# Patient Record
Sex: MALE | Race: BLACK OR AFRICAN AMERICAN | NOT HISPANIC OR LATINO | Employment: OTHER | URBAN - METROPOLITAN AREA
[De-identification: names, ages, dates, MRNs, and addresses within clinical notes are randomized per-mention and may not be internally consistent; named-entity substitution may affect disease eponyms.]

---

## 2017-09-27 NOTE — PATIENT DISCUSSION
"""rec yag cap od today. see signed consent. "" ""Informed patient that their capsular opacification is visually significant and meets the minimum criteria for Capsulotomy by YAG laser to increase their vision and decrease their glare symptoms. RBALs of procedure discussed with patient.  Risks include increased IOP

## 2018-10-18 NOTE — PATIENT DISCUSSION
"""discussed with patient that the lesion is not a sinister lesion.  if he becomes bothered by it it can ""

## 2018-10-18 NOTE — PATIENT DISCUSSION
"""discussed with patient that he need to start artificial tears bid-qid to keep the eye moist. if he ""

## 2019-01-30 NOTE — PATIENT DISCUSSION
"""Recommend Lateral Tarsal Strip with 3 Snip Punctoplasty of the Right Lower Eyelid with excision ""

## 2019-03-21 NOTE — PATIENT DISCUSSION
"""healed well. suture removed from right lateral canthus.  patient to use candido to nasal lower lid ""

## 2019-05-30 NOTE — PATIENT DISCUSSION
"""healed welllateral tarsal strip right lower lid healed well. lid is slightly inflamed.  recommend ""

## 2021-07-28 NOTE — PATIENT DISCUSSION
Doing well s/p ectropion repair. Patient to use ANTONIETTA until 3 more nights then stop secondary to suture removal today.

## 2022-02-03 ENCOUNTER — NEW PATIENT (OUTPATIENT)
Dept: URBAN - METROPOLITAN AREA CLINIC 13 | Facility: CLINIC | Age: 72
End: 2022-02-03

## 2022-02-03 DIAGNOSIS — H47.233: ICD-10-CM

## 2022-02-03 DIAGNOSIS — H52.223: ICD-10-CM

## 2022-02-03 PROCEDURE — 92250 FUNDUS PHOTOGRAPHY W/I&R: CPT

## 2022-02-03 PROCEDURE — 92015 DETERMINE REFRACTIVE STATE: CPT

## 2022-02-03 PROCEDURE — 92004 COMPRE OPH EXAM NEW PT 1/>: CPT

## 2022-02-03 ASSESSMENT — KERATOMETRY
OS_K2POWER_DIOPTERS: 43.50
OS_K1POWER_DIOPTERS: 43.50
OS_AXISANGLE2_DEGREES: 60
OD_AXISANGLE2_DEGREES: 62
OD_K1POWER_DIOPTERS: 43.25
OD_K2POWER_DIOPTERS: 42.25
OS_AXISANGLE_DEGREES: 150
OD_AXISANGLE_DEGREES: 152

## 2022-02-03 ASSESSMENT — TONOMETRY
OD_IOP_MMHG: 15
OS_IOP_MMHG: 11

## 2022-02-03 ASSESSMENT — VISUAL ACUITY
OD_SC: 20/25
OS_SC: 20/25
OU_SC: 20/20

## 2022-02-03 NOTE — PATIENT DISCUSSION
Patient has beginings of cats forming. He also has big optic nerve, iop was low so we will do baseline testing if there are changes.

## 2022-03-28 NOTE — PATIENT DISCUSSION
Plan: i-lasik verna OD. [facility] let team know when pt is scheduled so PO drops can be sent.

## 2022-05-19 NOTE — PATIENT DISCUSSION
Plan: i-lasik verna OD. [facility] let team know when pt is scheduled so PO drops can be sent.

## 2022-05-20 NOTE — PATIENT DISCUSSION
Plan: i-lasik verna OD. [facility] let team know when pt is scheduled so PO drops can be sent.

## 2022-05-27 NOTE — PATIENT DISCUSSION
Plan: i-lasik verna OD. [facility] let team know when pt is scheduled so PO drops can be sent.

## 2022-06-17 NOTE — PATIENT DISCUSSION
Plan: i-lasik verna OD. [facility] let team know when pt is scheduled so PO drops can be sent.

## 2022-06-17 NOTE — PATIENT DISCUSSION
Recommended warm compresses and PF AT's. [Change in Weight] : change in weight [Negative] : Genitourinary

## 2022-08-04 NOTE — PATIENT DISCUSSION
MR stable and patient noticed significant improvement with trial frame. Consult with Dr. Candelario Maria for enhancement. Patient leaving for [place] on Saturday and returning in mid October.

## 2022-08-04 NOTE — PATIENT DISCUSSION
Plan: i-lasik verna OD. [facility] let team know when pt is scheduled so PO drops can be sent.

## 2022-11-16 NOTE — PATIENT DISCUSSION
11/16/22 JOD: Patient did not see significant improvement with MR today. His vision blurriness is possibly secondary to ocular dryness and AMD. Treat try eye and refer to retina.

## 2022-11-16 NOTE — PROCEDURE NOTE: CLINICAL
PROCEDURE NOTE: Punctal Plugs, Collagen  #4 All 4 Lids. Diagnosis: Dry Eye Syndrome. Anesthesia: Proparacaine 0.5%. Prior to treatment, the risks/benefits/alternatives were discussed. The patient wished to proceed with procedure. 1 drop of Proparacaine 0.5% was instilled in eye. Puncta was dilated with punctal dilator. Collagen plugs were placed into the selected punctum with forceps. Size/location of plugs inserted: 0.5mm/all 4 lids. The patient tolerated the procedure well. There were no complications. Post procedure instructions given. Swapna Georges

## 2022-11-16 NOTE — PATIENT DISCUSSION
MR stable and patient noticed significant improvement with trial frame. Consult with Dr. Tiny Crawford for enhancement. Patient leaving for [place] on Saturday and returning in mid October.

## 2022-11-29 NOTE — PATIENT DISCUSSION
MR stable and patient noticed significant improvement with trial frame. Consult with Dr. Wan Du for enhancement. Patient leaving for [place] on Saturday and returning in mid October.

## 2023-04-13 ENCOUNTER — ESTABLISHED PATIENT (OUTPATIENT)
Dept: URBAN - METROPOLITAN AREA CLINIC 13 | Facility: CLINIC | Age: 73
End: 2023-04-13

## 2023-04-13 DIAGNOSIS — H25.13: ICD-10-CM

## 2023-04-13 DIAGNOSIS — H47.233: ICD-10-CM

## 2023-04-13 DIAGNOSIS — H52.223: ICD-10-CM

## 2023-04-13 DIAGNOSIS — Z01.00: ICD-10-CM

## 2023-04-13 PROCEDURE — 92015 DETERMINE REFRACTIVE STATE: CPT

## 2023-04-13 PROCEDURE — 92014 COMPRE OPH EXAM EST PT 1/>: CPT

## 2023-04-13 PROCEDURE — 92250 FUNDUS PHOTOGRAPHY W/I&R: CPT

## 2023-04-13 ASSESSMENT — VISUAL ACUITY
OU_CC: 20/20-1
OS_CC: 20/40
OD_CC: 20/20-1

## 2023-04-13 ASSESSMENT — TONOMETRY
OS_IOP_MMHG: 19
OD_IOP_MMHG: 18

## 2023-11-16 ENCOUNTER — DIAGNOSTICS ONLY (OUTPATIENT)
Dept: URBAN - METROPOLITAN AREA CLINIC 13 | Facility: CLINIC | Age: 73
End: 2023-11-16

## 2023-11-16 DIAGNOSIS — H47.233: ICD-10-CM

## 2023-11-16 PROCEDURE — 92083 EXTENDED VISUAL FIELD XM: CPT

## 2023-11-16 PROCEDURE — 92133 CPTRZD OPH DX IMG PST SGM ON: CPT

## 2023-11-16 PROCEDURE — 99214 OFFICE O/P EST MOD 30 MIN: CPT

## 2023-11-16 ASSESSMENT — VISUAL ACUITY
OD_CC: 20/20-1
OS_CC: 20/40
OU_CC: 20/20-1

## 2023-11-16 ASSESSMENT — TONOMETRY
OD_IOP_MMHG: 13
OS_IOP_MMHG: 13

## 2024-04-18 ENCOUNTER — PREPPED CHART (OUTPATIENT)
Dept: URBAN - METROPOLITAN AREA CLINIC 13 | Facility: CLINIC | Age: 74
End: 2024-04-18

## 2024-06-06 ENCOUNTER — ESTABLISHED PATIENT (OUTPATIENT)
Dept: URBAN - METROPOLITAN AREA CLINIC 13 | Facility: CLINIC | Age: 74
End: 2024-06-06

## 2024-06-06 DIAGNOSIS — H47.233: ICD-10-CM

## 2024-06-06 DIAGNOSIS — H25.13: ICD-10-CM

## 2024-06-06 DIAGNOSIS — H52.223: ICD-10-CM

## 2024-06-06 PROCEDURE — 92014 COMPRE OPH EXAM EST PT 1/>: CPT

## 2024-06-06 PROCEDURE — 92015 DETERMINE REFRACTIVE STATE: CPT

## 2024-06-06 PROCEDURE — 92250 FUNDUS PHOTOGRAPHY W/I&R: CPT

## 2024-06-06 ASSESSMENT — VISUAL ACUITY
OS_SC: 20/40-2
OD_SC: 20/30
OU_SC: 20/30

## 2024-06-06 ASSESSMENT — KERATOMETRY
OD_AXISANGLE2_DEGREES: 72
OS_AXISANGLE2_DEGREES: 32
OD_K1POWER_DIOPTERS: 43.00
OD_K2POWER_DIOPTERS: 43.75
OS_K1POWER_DIOPTERS: 44.00
OD_AXISANGLE_DEGREES: 162
OS_AXISANGLE_DEGREES: 122
OS_K2POWER_DIOPTERS: 44.50

## 2024-06-06 ASSESSMENT — TONOMETRY
OS_IOP_MMHG: 11
OD_IOP_MMHG: 11

## 2025-06-19 ENCOUNTER — COMPREHENSIVE EXAM (OUTPATIENT)
Age: 75
End: 2025-06-19

## 2025-06-19 DIAGNOSIS — H47.233: ICD-10-CM

## 2025-06-19 DIAGNOSIS — H52.4: ICD-10-CM

## 2025-06-19 PROCEDURE — 92250 FUNDUS PHOTOGRAPHY W/I&R: CPT

## 2025-06-19 PROCEDURE — 92014 COMPRE OPH EXAM EST PT 1/>: CPT

## 2025-06-19 PROCEDURE — 92015 DETERMINE REFRACTIVE STATE: CPT
